# Patient Record
(demographics unavailable — no encounter records)

---

## 2025-01-31 NOTE — ASSESSMENT
[FreeTextEntry1] : 67 year old male with medical history significant for COPD, bipolar disorder, GERD, CAD s/p MI 2019, former smoker presenting today referred by [facility] for stroke. No complaints today. No focal neuro deficit. MRI brain 9/18/2024 - no acute or evolving infarct is seen but there is mild white matter microvascular disease in both cerebral hemispheres and a chronic lacunar infarct in L thalamus  Discussed that this is incidental finding, likely silent stroke - small vessel disease. BP today 153/73 per pt is high for him.   Recommendations: - will obtain recent b/w done by primary neurologist - advised pt we would like to check HbA1c, lipid panel - MRA head and neck to evaluate for vessel abnormalities - continue ASA 81 mg daily for secondary prevention of stroke - cardiology f/u to r/o cardioembolic causes of stroke - pt has h/o CAD, MI, ?erratic heartbeat - referral given. stroke risk factor modifications discussed in detail - BP control - encouraged pt to get OTC BP cuff, check 2-3x daily and PRN with symptoms, keep journal and f/u with PCP or cardiology for management - heart rate and rhythm control - glucose and cholesterol control - healthy lifestyle, diet, physical activity as tolerated - close f/u with [facility] for treatment of dizziness, agree with ENT eval  Patient to return to office in 3-4 weeks to review results, or sooner if needed. Patient understands to seek urgent medical evaluation for any new or worsening symptoms.

## 2025-01-31 NOTE — HISTORY OF PRESENT ILLNESS
[FreeTextEntry1] : Cornelio Francisco is a 67 year old male with medical history significant for COPD, bipolar disorder, GERD, CAD s/p MI 2019, former smoker presenting today referred by [facility] for stroke.   He was being seen by [facility] for dizziness associated with ear crackles/muffling. He was referred to see ENT, still pending evaluation. MRI incidentally found stroke, so he was referred here. He was unaware he ever had a stroke, denies facial droop, slurred speech, difficulty speaking, unilateral weakness. He states he takes an aspirin 81mg every day since he had a heart attack 6 years ago. He has not seen cardiology recently. He had b/w recently with [facility] "they took 20 vials." He quit smoking over several years ago. Denies any complaints at present time, including numbness, vision disturbance, hearing issues/tinnitus, HA, CP, SOB, focal weakness, change in bowel, bladder habits, trauma, LOC, fever, chills.  MRI brain 9/18/2024 - no acute or evolving infarct is seen but there is mild white matter microvascular disease in both cerebral hemispheres and a chronic lacunar infarct in L thalamus

## 2025-01-31 NOTE — PHYSICAL EXAM
[FreeTextEntry1] : GENERAL PHYSICAL EXAM: GEN: no acute distress, normal affect EYES:  sclera white, conjunctiva clear Neck: Normal ROM, no muscle spasm, tenderness SKIN: warm, dry   NEUROLOGICAL EXAM: Mental Status Orientation: alert and oriented to person, place, time, and situation Language: clear and fluent, intact comprehension and repetition   Cranial Nerves II: visual fields full to confrontation III, IV, VI:  PERRL, EOMI, VFF, no nystagmus, no ptosis V, VII: facial sensation and movement intact and symmetric VIII: hearing intact to finger rub bilat IX, X: uvula midline, soft palate elevates normally XI: BL shoulder shrug intact, lateral head movement 5/5 bilat XII: tongue midline   Motor: Bilateral muscle strength 5/5 in UE and LE, proximally and distally, symmetric throughout Tone and bulk are normal in upper and lower limbs   Sensation: Intact to light touch all 4 EXTs,   Coordination: Normal FNF bilateral   Reflex: 2+ in BL biceps, brachioradialis, triceps. 1+ bilateral patella.   Gait Normal stance, stride, and pivot turn, Tandem walk intact

## 2025-03-04 NOTE — HISTORY OF PRESENT ILLNESS
[FreeTextEntry1] : 67 year old male referred for recent CVA.    Was told he has a CVA recently.  He has some dizziness. Also says he had a heart attack many years. Has some shortness of breath.  Sometimes he feels the off balance. Denies any chest pain.

## 2025-03-04 NOTE — DISCUSSION/SUMMARY
[FreeTextEntry1] : 1) Lacunar Stroke:  Unclear etiology. Will order US of carotids, echocardiogram and 5 day Heart monitor to check for etiology of stroke.  Continue ASA 81mg QD 2) CAD: Unclear h/o MI.  Will check echocardiogram for LV EF.  Denies any CP. C/w ASA 81mg QD. Will consider CT or stress next visit.  3) Exercise counseling: Discussed and encouraged exercise and heart healthy diet with patient. 4) Depression: C/w Lexapro 20mg QD  [EKG obtained to assist in diagnosis and management of assessed problem(s)] : EKG obtained to assist in diagnosis and management of assessed problem(s)

## 2025-03-18 NOTE — DISCUSSION/SUMMARY
[FreeTextEntry1] : 1) Lacunar Stroke: Relatively normal echocardiogram.  Getting MRI of head and neck soon. Continue ASA 81mg QD 2) CAD: Normal EF.  Denies any CP. C/w ASA 81mg QD.  3) Exercise counseling: Discussed and encouraged exercise and heart healthy diet with patient. 4) Depression: C/w Lexapro 20mg QD

## 2025-04-10 NOTE — HISTORY OF PRESENT ILLNESS
[FreeTextEntry1] : 67 year old male referred for recent CVA.    Was told he has a CVA recently.  He has some dizziness. Also says he had a heart attack many years. Has some shortness of breath.  Sometimes he feels the off balance. Denies any chest pain.   3/18/25: Feels OK, still having some dizziness.   mammogram